# Patient Record
Sex: FEMALE | Race: BLACK OR AFRICAN AMERICAN
[De-identification: names, ages, dates, MRNs, and addresses within clinical notes are randomized per-mention and may not be internally consistent; named-entity substitution may affect disease eponyms.]

---

## 2021-01-10 ENCOUNTER — HOSPITAL ENCOUNTER (EMERGENCY)
Dept: HOSPITAL 63 - ER | Age: 21
Discharge: HOME | End: 2021-01-10
Payer: SELF-PAY

## 2021-01-10 VITALS — HEIGHT: 64 IN | WEIGHT: 123.46 LBS | BODY MASS INDEX: 21.08 KG/M2

## 2021-01-10 VITALS — DIASTOLIC BLOOD PRESSURE: 71 MMHG | SYSTOLIC BLOOD PRESSURE: 105 MMHG

## 2021-01-10 DIAGNOSIS — O23.591: Primary | ICD-10-CM

## 2021-01-10 DIAGNOSIS — Z3A.09: ICD-10-CM

## 2021-01-10 DIAGNOSIS — B96.89: ICD-10-CM

## 2021-01-10 LAB
APTT PPP: YELLOW S
BACTERIA #/AREA URNS HPF: (no result) /HPF
BILIRUB UR QL STRIP: (no result)
FIBRINOGEN PPP-MCNC: (no result) MG/DL
GLUCOSE UR STRIP-MCNC: (no result) MG/DL
NITRITE UR QL STRIP: (no result)
RBC #/AREA URNS HPF: (no result) /HPF (ref 0–2)
SP GR UR STRIP: 1.02
SQUAMOUS #/AREA URNS LPF: (no result) /LPF
UROBILINOGEN UR-MCNC: 1 MG/DL
WBC #/AREA URNS HPF: (no result) /HPF (ref 0–4)

## 2021-01-10 PROCEDURE — 81025 URINE PREGNANCY TEST: CPT

## 2021-01-10 PROCEDURE — 99283 EMERGENCY DEPT VISIT LOW MDM: CPT

## 2021-01-10 PROCEDURE — 81001 URINALYSIS AUTO W/SCOPE: CPT

## 2021-01-10 NOTE — PHYS DOC
Past History


Past Medical History:  No Pertinent History


Past Surgical History:  Other


Additional Past Surgical Histo:  right arm


Alcohol Use:  None





Adult General


Chief Complaint


Chief Complaint:  VAGINAL PROBLEM





HPI


HPI





Patient is a 20-year-old female complaining for vaginal discharge and odor.  

Patient is 9 pregnant, first found out she was pregnant yesterday and currently 

pending outpatient OB/GYN visit to establish care.  Here today as she started 

noticing thicker discharge than usual with a foul-smelling odor.  She is here 

today concerned for bacterial vaginosis.  She has unprotected sex with x1 

individual, her partner.  She has no history of STDs.  She does not want to be 

checked for gonorrhea and chlamydia today and deferred this to her initial 

OB/GYN visit, she just wants to be checked for bacterial vaginosis today





Review of Systems


Review of Systems


Fourteen body systems of review of systems have been reviewed. See HPI for 

pertinent positives and negative responses, other wise all other systems are 

negative, non-pertinent or non-contributory





Allergies


Allergies





Allergies








Coded Allergies Type Severity Reaction Last Updated Verified


 


  No Known Drug Allergies    1/10/21 No











Physical Exam


Physical Exam


Constitutional: Well developed, well nourished, no acute distress, non-toxic 

appearance. 


HENT: Normocephalic, atraumatic, bilateral external ears normal, oropharynx 

moist, no oral exudates, nose normal. 


Eyes: PERRLA, EOMI, conjunctiva normal, no discharge.  


Neck: Normal range of motion, no tenderness, supple, no stridor.  


Cardiovascular: Heart rate regular, sinus rhythm, no murmurs rubs or gallops


Lungs & Thorax:  Bilateral breath sounds clear to auscultation 


Abdomen: Bowel sounds normal, soft, no tenderness, no masses, no pulsatile 

masses.  Nonsurgical abdomen, no peritoneal signs


: External genitalia unremarkable, no lesions or obvious exudate, vaginal 

vault unremarkable, cervix well-appearing, there is moderate amount of 

malodorous thick white discharge without any other concerning abnormalities or 

foreign bodies


Skin: Warm, dry, no erythema, no rash.  


Back: No tenderness, no CVA tenderness.  


Extremities: No tenderness, no cyanosis, no clubbing, ROM intact, no edema.  


Neurologic: Alert and oriented X 3, grossly normal motor & sensory function, no 

focal deficits noted. 


Psychologic: Anxious affect and mood





Current Patient Data


Vital Signs





                                   Vital Signs








  Date Time  Temp Pulse Resp B/P (MAP) Pulse Ox O2 Delivery O2 Flow Rate FiO2


 


1/10/21 07:36 98.1 79 18 105/71 (82) 99 Room Air  








Lab Results





Laboratory Tests








Test


 1/10/21


07:40 1/10/21


07:48


 


Urine Collection Type Unknown  


 


Urine Color Yellow  


 


Urine Clarity Hazy  


 


Urine pH 7.0  


 


Urine Specific Gravity 1.025  


 


Urine Protein Neg  


 


Urine Glucose (UA) Neg mg/dL  


 


Urine Ketones (Stick) Neg mg/dL  


 


Urine Blood Neg  


 


Urine Nitrite Neg  


 


Urine Bilirubin Neg  


 


Urine Urobilinogen Dipstick 1.0 mg/dL  


 


Urine Leukocyte Esterase Neg  


 


Urine RBC Occ /HPF  


 


Urine WBC 1-4 /HPF  


 


Urine Squamous Epithelial


Cells Few /LPF 


 





 


Urine Bacteria Few /HPF  


 


Urine Mucus Slight /LPF  


 


Bedside Urine HCG, Qualitative  hcg positive 











EKG


EKG


[]





Radiology/Procedures


Radiology/Procedures


[]





Heart Score


Risk Factors:


Risk Factors:  DM, Current or recent (<one month) smoker, HTN, HLP, family 

history of CAD, obesity.


Risk Scores:


Risk Factors:  DM, Current or recent (<one month) smoker, HTN, HLP, family 

history of CAD, obesity.





Course & Med Decision Making


Course & Med Decision Making


Discussed with the patient all findings and diagnostic testing. I discussed most

 likely diagnosis of bacterial vaginosis.  Discussed treatment options that 

included Flagyl or clindamycin, joint decision to pursue Flagyl therapy. I 

stressed need for close outpatient follow-up to review today's ER visit. Strict 

return precautions were also discussed at length with good understanding by 

patient. Patient voiced understanding and agreement with the plan. Patient knows

 to come back for repeat evaluation if concerning signs or symptoms present 

prior to outpatient follow-up. Hemodynamically stable, ambulatory and well-

appearing at time of disposition.





Dragon Disclaimer


Dragon Disclaimer


This electronic medical record was generated, in whole or in part, using a voice

 recognition dictation system.





Departure


Departure:


Impression:  


   Primary Impression:  


   Bacterial vaginosis in pregnancy


Disposition:  01 DC HOME SELF CARE/HOMELESS


Condition:  GOOD


Referrals:  


PCP,CHRIS (PCP)


Patient Instructions:  Bacterial Vaginosis





Additional Instructions:  


As discussed prior to ER departure, you were diagnosed with bacterial vaginosis.

  This is not an STD.  This will respond to medical therapy with Flagyl 

medication that was given to you prior to ER departure.  It is imperative 

anyways that you do not drink while pregnant but it is especially imperative to 

not drink while using this medication.  Please follow-up with your OB/GYN to 

establish care as he recently found out you are pregnant.  If any concerning 

signs or symptoms present prior to outpatient follow-up please do not hesitate 

to come back for repeat evaluation.  It was a pleasure to take care of you and I

 wish you the best going forward


Scripts


Metronidazole (FLAGYL) 500 Mg Tablet


500 MG PO BID for bacterial vaginosis for 7 Days, #13 TAB


   Prov: ROSA VALLEJO DO         1/10/21











ROSA VALLEJO DO                 Shamir 10, 2021 07:51

## 2021-01-31 ENCOUNTER — HOSPITAL ENCOUNTER (EMERGENCY)
Dept: HOSPITAL 63 - ER | Age: 21
Discharge: HOME | End: 2021-01-31
Payer: SELF-PAY

## 2021-01-31 VITALS — SYSTOLIC BLOOD PRESSURE: 124 MMHG | DIASTOLIC BLOOD PRESSURE: 62 MMHG

## 2021-01-31 VITALS — HEIGHT: 63 IN | BODY MASS INDEX: 21.09 KG/M2 | WEIGHT: 119.05 LBS

## 2021-01-31 DIAGNOSIS — R10.9: ICD-10-CM

## 2021-01-31 DIAGNOSIS — Z98.890: ICD-10-CM

## 2021-01-31 DIAGNOSIS — O21.9: Primary | ICD-10-CM

## 2021-01-31 LAB
APTT PPP: YELLOW S
BACTERIA #/AREA URNS HPF: 0 /HPF
BASOPHILS # BLD AUTO: 0 X10^3/UL (ref 0–0.2)
BASOPHILS NFR BLD: 1 % (ref 0–3)
BILIRUB UR QL STRIP: (no result)
BUN ISTAT: 8 MG/DL (ref 8–26)
EOSINOPHIL NFR BLD: 0.1 X10^3/UL (ref 0–0.7)
EOSINOPHIL NFR BLD: 1 % (ref 0–3)
ERYTHROCYTE [DISTWIDTH] IN BLOOD BY AUTOMATED COUNT: 13.6 % (ref 11.5–14.5)
FIBRINOGEN PPP-MCNC: (no result) MG/DL
GLUCOSE BLD-MCNC: 79 MG/DL (ref 60–99)
GLUCOSE UR STRIP-MCNC: (no result) MG/DL
HCT VFR BLD AUTO: 38 %
HCT VFR BLD CALC: 43 % (ref 36–47)
HEMOGLOBIN ISTAT: 12.9 GM/DL
HGB BLD-MCNC: 14.4 G/DL (ref 12–15.5)
LYMPHOCYTES # BLD: 2.1 X10^3/UL (ref 1–4.8)
LYMPHOCYTES NFR BLD AUTO: 48 % (ref 24–48)
MCH RBC QN AUTO: 32 PG (ref 25–35)
MCHC RBC AUTO-ENTMCNC: 34 G/DL (ref 31–37)
MCV RBC AUTO: 94 FL (ref 79–100)
MONO #: 0.5 X10^3/UL (ref 0–1.1)
MONOCYTES NFR BLD: 10 % (ref 0–9)
NEUT #: 1.8 X10^3UL (ref 1.8–7.7)
NEUTROPHILS NFR BLD AUTO: 40 % (ref 31–73)
NITRITE UR QL STRIP: (no result)
PLATELET # BLD AUTO: 123 X10^3/UL (ref 140–400)
POTASSIUM BLD-SCNC: 3.7 MMOL/L (ref 3.5–5)
RBC # BLD AUTO: 4.56 X10^6/UL (ref 3.5–5.4)
RBC #/AREA URNS HPF: (no result) /HPF (ref 0–2)
SODIUM SERPL-SCNC: 138 MMOL/L (ref 135–145)
SP GR UR STRIP: 1.02
SQUAMOUS #/AREA URNS LPF: (no result) /LPF
UROBILINOGEN UR-MCNC: 0.2 MG/DL
WBC # BLD AUTO: 4.4 X10^3/UL (ref 4–11)
WBC #/AREA URNS HPF: (no result) /HPF (ref 0–4)

## 2021-01-31 PROCEDURE — 96361 HYDRATE IV INFUSION ADD-ON: CPT

## 2021-01-31 PROCEDURE — 99285 EMERGENCY DEPT VISIT HI MDM: CPT

## 2021-01-31 PROCEDURE — 36415 COLL VENOUS BLD VENIPUNCTURE: CPT

## 2021-01-31 PROCEDURE — 84702 CHORIONIC GONADOTROPIN TEST: CPT

## 2021-01-31 PROCEDURE — 93005 ELECTROCARDIOGRAM TRACING: CPT

## 2021-01-31 PROCEDURE — 96374 THER/PROPH/DIAG INJ IV PUSH: CPT

## 2021-01-31 PROCEDURE — 81025 URINE PREGNANCY TEST: CPT

## 2021-01-31 PROCEDURE — 81001 URINALYSIS AUTO W/SCOPE: CPT

## 2021-01-31 PROCEDURE — 71045 X-RAY EXAM CHEST 1 VIEW: CPT

## 2021-01-31 PROCEDURE — 76801 OB US < 14 WKS SINGLE FETUS: CPT

## 2021-01-31 PROCEDURE — 80047 BASIC METABLC PNL IONIZED CA: CPT

## 2021-01-31 PROCEDURE — 85025 COMPLETE CBC W/AUTO DIFF WBC: CPT

## 2021-01-31 NOTE — RAD
EXAM: Pelvic sonogram.



HISTORY: Bleeding and pain.



TECHNIQUE: Sonographic imaging of the pelvis was performed.



COMPARISON: None.



FINDINGS: The uterus measures 8.7 x 5.3 x 8.2 cm. There is a single intrauterine gestational sac with
 fetal pole and yolk sac. The fetal crown-rump length is 6.3 mm, corresponding with a gestational age
 of 6 weeks and 3 days. The fetal heart rate is 140 bpm. The gestational sac is normal in considerati
on and location. There is no evidence of a subchorionic hematoma. The ovaries are normal in size and 
demonstrate normal blood flow. There is no pelvic free fluid.



IMPRESSION:

1. Single intrauterine fetus with normal heart rate and gestational age based on ultrasound measureme
nts of 6 weeks and 3 days.

2. No acute sonographic finding.



Electronically signed by: Cathleen Moreno MD (1/31/2021 10:31 AM) [facility]

## 2021-01-31 NOTE — RAD
EXAM: CHEST 1 VIEW 



History: Chest pain 



COMPARISON: None available.



TECHNIQUE: Single portable radiograph of the chest



FINDINGS:  The cardiac silhouette is unremarkable. The lungs are clear bilaterally. The costophrenic 
sulci are clear and well demarcated.



IMPRESSION:  No radiographic evidence of an acute cardiopulmonary process.

 



Electronically signed by: Torin Marie MD (1/31/2021 9:33 AM) [id number]

## 2021-01-31 NOTE — PHYS DOC
Past History


Past Medical History:  No Pertinent History


Past Surgical History:  Other


Additional Past Surgical Histo:  right arm


Alcohol Use:  None





Adult General


Chief Complaint


Chief Complaint:  NAUSEA/VOMITING/DIARRHEA





HPI


HPI


Patient is a 20-year-old female who presents to the emergency room complaining 

of abdominal pain, nausea, vomiting, spotting.  Patient reports that she is 

pregnant.  She is unsure how far along she is.  She had a positive pregnancy 

test at the beginning of this month.  She has her first OB appointment tomorrow.

 She states symptoms started a couple days ago.  This is her second pregnancy.  

She states she had vomiting with her first pregnancy as well.  She states the 

abdominal pain is lower and feels like cramping.  She states that she has wiped 

a couple of times over the last couple days and has had a small pink tinge 

suggestive of spotting.  She feels like she is unable to keep anything down.





Review of Systems


Review of Systems


Complete ROS is negative unless otherwise documented in HPI





Current Medications


Current Medications





Current Medications








 Medications


  (Trade)  Dose


 Ordered  Sig/Shalom  Start Time


 Stop Time Status Last Admin


Dose Admin


 


 Ondansetron HCl


  (Zofran)  4 mg  1X  ONCE  1/31/21 08:30


 1/31/21 08:34 DC 1/31/21 08:45


4 MG


 


 Sodium Chloride  1,000 ml @ 


 1,000 mls/hr  1X  ONCE  1/31/21 08:30


 1/31/21 09:29  1/31/21 08:46


1,000 MLS/HR











Allergies


Allergies





Allergies








Coded Allergies Type Severity Reaction Last Updated Verified


 


  No Known Drug Allergies    1/10/21 No











Physical Exam


Physical Exam


General: Awake, alert, NAD. Well Nourished, well hydrated. Cooperative


HEENT: Atraumatic, EOMI, PERRL, airway patent, moist oral mucosa


Neck: Supple, trachea midline


Respiratory: CTA bilaterally, normal effort, no wheezing/crackles


CV: RRR, no murmur, cap refill <2


GI: Soft, nondistended, nontender, no masses


MSK: No obvious deformities


Skin: Warm, dry, intact


Neuro: A&O x3, speech NL, sensory and motor grossly intact, no focal deficits


Psych: Normal affect, normal mood, not suicidal or homicidal





Current Patient Data


Vital Signs





                                   Vital Signs








  Date Time  Temp Pulse Resp B/P (MAP) Pulse Ox O2 Delivery O2 Flow Rate FiO2


 


1/31/21 08:13 97.4 82 16 124/62 (82) 100 Room Air  











EKG


EKG


[]





Radiology/Procedures


Radiology/Procedures


[]





Heart Score


Risk Factors:


Risk Factors:  DM, Current or recent (<one month) smoker, HTN, HLP, family 

history of CAD, obesity.


Risk Scores:


Risk Factors:  DM, Current or recent (<one month) smoker, HTN, HLP, family 

history of CAD, obesity.





Course & Med Decision Making


Course & Med Decision Making


Pertinent Labs and Imaging studies reviewed. (See chart for details)





Patient is a 20-year-old female who presents to the emergency room complaining 

of vomiting, abdominal pain, spotting in pregnancy.  Given that she is not had 

an ultrasound yet we will do a beta-hCG and an ultrasound to rule out ectopic p

regnancy.  CBC and BMP ordered to evaluate patient's hydration status and any 

signs of infection or anemia.  Patient was given fluids and Zofran.  While here 

in the emergency room patient developed chest pain.  EKG and chest x-ray were 

ordered.  Patient was given Tylenol.  She is not hypoxic and pain is not 

suggestive of a pulmonary embolism.  Patient is feeling significantly better.  

She will be discharged home on Zofran.  She has a follow-up appointment 

tomorrow.  Patient's test results and vitals while in the ED were fully reviewed

 and discussed with the patient. Patient is stable and at this time does not 

need admission to the hospital. We have discussed strict return precautions and 

the importance of following up with their Primary Care Physician. Patient stated

 understanding and was given an opportunity to ask any questions. Patient is in 

agreement with plan.





Dragon Disclaimer


Dragon Disclaimer


This electronic medical record was generated, in whole or in part, using a voice

 recognition dictation system.





Departure


Departure:


Impression:  


   Primary Impression:  


   Vomiting affecting pregnancy


Disposition:  01 DC HOME SELF CARE/HOMELESS


Condition:  IMPROVED


Referrals:  


PCP,NO (PCP)


Patient Instructions:  Abdominal Pain During Pregnancy


Scripts


Nitrofurantoin Monohyd/M-Cryst (MACROBID 100 MG CAPSULE) 100 Mg Capsule


1 CAP PO BID for UTI for 5 Days, #10 CAP 0 Refills


   Prov: ROHIT KINSEY MD         1/31/21 


Doxylamine/Pyridoxine Hcl (DICLEGIS  10-10 MG TABLET) 1 Each Tablet.


2 TAB PO QHS for morning sickness for 30 Days, #60 TAB 0 Refills


   Prov: ROHIT KINSEY MD         1/31/21 


Ondansetron (ONDANSETRON ODT) 4 Mg Tab.rapdis


1 TAB PO PRN Q6-8HRS for nausea, #16 TAB


   Prov: ROHIT KINSEY MD         1/31/21











ROHIT KINSEY MD              Jan 31, 2021 09:05

## 2021-01-31 NOTE — EKG
[facility] [address], [place] [address]

Test Date:    2021               Test Time:    09:10:34

Pat Name:     OTF JONES           Department:   

Patient ID:   [id number]           Room:          

Gender:       F                        Technician:   ARAMIS

:          2000               Requested By: ROHIT KINSEY

Order Number: [id number]            Reading MD:     

                                 Measurements

Intervals                              Axis          

Rate:         85                       P:            64

PA:           164                      QRS:          89

QRSD:         96                       T:            62

QT:           346                                    

QTc:          417                                    

                           Interpretive Statements

SINUS RHYTHM

S1,S2,S3 PATTERN

NO SPECIFIC ECG ABNORMALITIES

RI6.02

No previous ECG available for comparison

## 2021-02-15 ENCOUNTER — HOSPITAL ENCOUNTER (EMERGENCY)
Dept: HOSPITAL 63 - ER | Age: 21
Discharge: HOME | End: 2021-02-15
Payer: SELF-PAY

## 2021-02-15 VITALS
SYSTOLIC BLOOD PRESSURE: 143 MMHG | WEIGHT: 119.05 LBS | DIASTOLIC BLOOD PRESSURE: 69 MMHG | HEIGHT: 63 IN | BODY MASS INDEX: 21.09 KG/M2

## 2021-02-15 DIAGNOSIS — Z3A.08: ICD-10-CM

## 2021-02-15 DIAGNOSIS — R10.13: ICD-10-CM

## 2021-02-15 DIAGNOSIS — O21.0: Primary | ICD-10-CM

## 2021-02-15 DIAGNOSIS — F12.10: ICD-10-CM

## 2021-02-15 DIAGNOSIS — O99.321: ICD-10-CM

## 2021-02-15 LAB
ALBUMIN SERPL-MCNC: 3.6 G/DL (ref 3.4–5)
ALP SERPL-CCNC: 51 U/L (ref 46–116)
ALT SERPL-CCNC: 36 U/L (ref 14–59)
AMPHETAMINE/METHAMPHETAMINE: (no result)
ANION GAP SERPL CALC-SCNC: 10 MMOL/L (ref 6–14)
APTT PPP: YELLOW S
AST SERPL-CCNC: 19 U/L (ref 15–37)
BACTERIA #/AREA URNS HPF: (no result) /HPF
BARBITURATES UR-MCNC: (no result) UG/ML
BASOPHILS # BLD AUTO: 0 X10^3/UL (ref 0–0.2)
BASOPHILS NFR BLD: 0 % (ref 0–3)
BENZODIAZ UR-MCNC: (no result) UG/L
BILIRUB DIRECT SERPL-MCNC: 0.2 MG/DL (ref 0–0.2)
BILIRUB SERPL-MCNC: 0.6 MG/DL (ref 0.2–1)
BILIRUB UR QL STRIP: (no result)
CA-I SERPL ISE-MCNC: 9 MG/DL (ref 7–20)
CALCIUM SERPL-MCNC: 8.9 MG/DL (ref 8.5–10.1)
CANNABINOIDS UR-MCNC: (no result) UG/L
CHLORIDE SERPL-SCNC: 101 MMOL/L (ref 98–107)
CO2 SERPL-SCNC: 26 MMOL/L (ref 21–32)
COCAINE UR-MCNC: (no result) NG/ML
CREAT SERPL-MCNC: 0.9 MG/DL (ref 0.6–1)
EOSINOPHIL NFR BLD: 0 X10^3/UL (ref 0–0.7)
EOSINOPHIL NFR BLD: 1 % (ref 0–3)
ERYTHROCYTE [DISTWIDTH] IN BLOOD BY AUTOMATED COUNT: 13.2 % (ref 11.5–14.5)
FIBRINOGEN PPP-MCNC: CLEAR MG/DL
GFR SERPLBLD BASED ON 1.73 SQ M-ARVRAT: 95.6 ML/MIN
GLUCOSE SERPL-MCNC: 89 MG/DL (ref 70–99)
GLUCOSE UR STRIP-MCNC: (no result) MG/DL
HCT VFR BLD CALC: 39 % (ref 36–47)
HGB BLD-MCNC: 13.2 G/DL (ref 12–15.5)
LIPASE: 106 U/L (ref 73–393)
LYMPHOCYTES # BLD: 1.6 X10^3/UL (ref 1–4.8)
LYMPHOCYTES NFR BLD AUTO: 29 % (ref 24–48)
MCH RBC QN AUTO: 31 PG (ref 25–35)
MCHC RBC AUTO-ENTMCNC: 34 G/DL (ref 31–37)
MCV RBC AUTO: 92 FL (ref 79–100)
METHADONE SERPL-MCNC: (no result) NG/ML
MONO #: 0.4 X10^3/UL (ref 0–1.1)
MONOCYTES NFR BLD: 7 % (ref 0–9)
NEUT #: 3.5 X10^3UL (ref 1.8–7.7)
NEUTROPHILS NFR BLD AUTO: 63 % (ref 31–73)
NITRITE UR QL STRIP: (no result)
OPIATES UR-MCNC: (no result) NG/ML
PCP SERPL-MCNC: (no result) MG/DL
PLATELET # BLD AUTO: 169 X10^3/UL (ref 140–400)
POTASSIUM SERPL-SCNC: 3.8 MMOL/L (ref 3.5–5.1)
PROT SERPL-MCNC: 7.9 G/DL (ref 6.4–8.2)
RBC # BLD AUTO: 4.25 X10^6/UL (ref 3.5–5.4)
RBC #/AREA URNS HPF: (no result) /HPF (ref 0–2)
SODIUM SERPL-SCNC: 137 MMOL/L (ref 136–145)
SP GR UR STRIP: 1.02
SQUAMOUS #/AREA URNS LPF: (no result) /LPF
UROBILINOGEN UR-MCNC: 0.2 MG/DL
WBC # BLD AUTO: 5.5 X10^3/UL (ref 4–11)
WBC #/AREA URNS HPF: (no result) /HPF (ref 0–4)

## 2021-02-15 PROCEDURE — 96374 THER/PROPH/DIAG INJ IV PUSH: CPT

## 2021-02-15 PROCEDURE — 80048 BASIC METABOLIC PNL TOTAL CA: CPT

## 2021-02-15 PROCEDURE — 86901 BLOOD TYPING SEROLOGIC RH(D): CPT

## 2021-02-15 PROCEDURE — 96376 TX/PRO/DX INJ SAME DRUG ADON: CPT

## 2021-02-15 PROCEDURE — 85025 COMPLETE CBC W/AUTO DIFF WBC: CPT

## 2021-02-15 PROCEDURE — 85730 THROMBOPLASTIN TIME PARTIAL: CPT

## 2021-02-15 PROCEDURE — 86900 BLOOD TYPING SEROLOGIC ABO: CPT

## 2021-02-15 PROCEDURE — 99284 EMERGENCY DEPT VISIT MOD MDM: CPT

## 2021-02-15 PROCEDURE — 96375 TX/PRO/DX INJ NEW DRUG ADDON: CPT

## 2021-02-15 PROCEDURE — 36415 COLL VENOUS BLD VENIPUNCTURE: CPT

## 2021-02-15 PROCEDURE — 81001 URINALYSIS AUTO W/SCOPE: CPT

## 2021-02-15 PROCEDURE — 96361 HYDRATE IV INFUSION ADD-ON: CPT

## 2021-02-15 PROCEDURE — 80307 DRUG TEST PRSMV CHEM ANLYZR: CPT

## 2021-02-15 PROCEDURE — 83735 ASSAY OF MAGNESIUM: CPT

## 2021-02-15 PROCEDURE — 76705 ECHO EXAM OF ABDOMEN: CPT

## 2021-02-15 PROCEDURE — 80076 HEPATIC FUNCTION PANEL: CPT

## 2021-02-15 PROCEDURE — 85610 PROTHROMBIN TIME: CPT

## 2021-02-15 PROCEDURE — 84702 CHORIONIC GONADOTROPIN TEST: CPT

## 2021-02-15 PROCEDURE — 83690 ASSAY OF LIPASE: CPT

## 2021-02-15 NOTE — PHYS DOC
Past History


Past Medical History:  No Pertinent History


 (ELYSE MCKEON MD)


Past Surgical History:  Other


Additional Past Surgical Histo:  right arm


 (ELYSE MCKEON MD)


Alcohol Use:  None


 (ELYSE MCKEON MD)





General Adult


HPI:


HPI:


".. I am out of my nausea.. meds.. the Zofran.. ..  I ate chicken quesadilla.. 

Last night.. But after I went to bed I started having my vomiting and.. I have  

... been vomiting all night long..... I was here back on the  for 

the same thing.. no spotting tonight....just this stomach pain from 

vomiting..."."  up here in the pit of my stomach..  " I  am vomiting so hard.. 

it makes my upper back and chest hurt now.. ".." Just drank water this morning 

.. and I am even puking that up.. "








Patient is a 20 year old female who presents with above hx and complaints 

intractable hyperemesis gravidarum since eating quesadilla last night.  Patient 

seen on arrival.  Patient is estimated 8 weeks pregnant.  This is her second 

pregnancy.  Did have similar symptoms with first pregnancy.  Pt. has been taking

prenatal vitamins when she is not vomiting.  Patient does have scheduled follow-

up at  where she plans to deliver.  Patient currently following at the health 

department.  Patient did have bacterial vaginosis on 1/10/2021 visit which was 

treated with antibiotics.  Patient requesting Zofran which worked for her in the

past for the intractable vomiting associated with her pregnancy.  Patient denies

any travel.  Patient denies significant ill contacts.  Patient denies any 

trauma.  Patient denies any intake of bad food.   Patient currently dry heaving 

during her interview.  Pt. has not had US as yet with this pregnancy.   Pt. 

denies prior history of gallbladder disease with her  or family members.


 (ELYSE MCKEON MD)





Review of Systems:


Review of Systems:


Constitutional:  Denies fever or chills 


Eyes:  Denies change in visual acuity 


HENT:  Denies nasal congestion or sore throat 


Respiratory:  Denies cough or shortness of breath 


Cardiovascular:  Denies chest pain or edema 


GI:  Complaints of epigastric and rt. upper quadrant abdominal pain, nausea, 

vomiting,.  Denies bloody stools or diarrhea 


: Denies dysuria 


Musculoskeletal:  Denies back pain or joint pain 


Integument:  Denies rash 


Neurologic:  Denies headache, focal weakness or sensory changes 


Endocrine:  Denies polyuria or polydipsia 


Lymphatic:  Denies swollen glands 


Psychiatric:  Denies depression or anxiety


 (ELYSE MCKEON MD)





Family History:


Family History:


Noncontributory to presentation.


 (ELYSE MCKEON MD)





Current Medications:


Current Meds:


See Nursing


 (ELYSE MCKEON MD)





Allergies:


Allergies:





Allergies








Coded Allergies Type Severity Reaction Last Updated Verified


 


  No Known Drug Allergies    1/10/21 No








 (ELYSE MCKEON MD)





Physical Exam:


PE:





Constitutional: in acute distress, non-toxic appearance. Actively dry heaving.]


HENT: Normocephalic, atraumatic, bilateral external ears normal, oropharynx dry,

no oral exudates, nose normal. []


Eyes: PERRLA, EOMI, conjunctiva normal, no discharge. [] 


Neck: Normal range of motion, no tenderness, supple, no stridor. [] 


Cardiovascular: Tachycardia Heart rate regular rhythm, no murmur []  Monitor 

show sinus tachycardia rhythm, no obvious acute morphology.


Lungs & Thorax:  Bilateral breath sounds equal at apexes on auscultation []


Abdomen: Bowel sounds decreased, soft, epigatric  and right upper abd. 

tenderness, no masses, no pulsatile masses.  Pt. deferring pelvic exam at this 

time states she has not had any discharge or bleeding.


Skin: Warm, dry, no erythema, no rash. [] 


Back: No tenderness, no CVA tenderness. [] 


Extremities: No tenderness, no cyanosis, no clubbing, ROM intact, no edema.  No 

cording appreciated.  Scar Rt. arm. 


Neurologic: Alert and oriented X 3, moves extremities on request, has distal 

sensory,, no focal deficits noted. [] DTRs +2 patella and brachial.


Psychologic: Affect anxious, judgement normal, mood normal. []


 (ELYSE MCKEON MD)





EKG:


EKG:


[]


 (ELYSE MCKEON MD)





Radiology/Procedures:


Radiology/Procedures:


Ultrasound pending at shift change []


Impressions:


                               [facility] [address], [place] [address]


                                 [phone]


                                        


                                 IMAGING REPORT





                                     Signed





PATIENT: OTF JONES ACCOUNT: [id number]     MRN#: [id number]


: 2000           LOCATION: ER              AGE: 20


SEX: F                    EXAM DT: 02/15/21         ACCESSION#: [id number]


STATUS: REG ER            ORD. PHYSICIAN: ELYSE MCKEON MD


REASON:  Rt upper,epigastric pain, est 8 week pregnant


PROCEDURE: ABDOMEN LTD





EXAM:  Gallbladder Ultrasound





INDICATION: Reason: Rt upper,epigastric pain, est 8 week pregnant / Spl. 

Instructions:  / History: 





TECHNIQUE:  Real-time ultrasound of the gallbladder was performed with permanent

 freeze-frame documentation.





COMPARISON: None





FINDINGS: 





BILIARY:?Gallbladder is unremarkable. No biliary ductal dilatation. The common 

bile duct measures 0.3 cm.





OTHER: Visualized liver, right kidney and pancreas are unremarkable.





IMPRESSION: 





Normal gallbladder ultrasound.





Electronically signed by: Melissa Mercado MD (2/15/2021 7:55 AM) [id number]














DICTATED AND SIGNED BY:     MELISSA MERCADO MD


DATE:     02/15/21 0754





CC: ELYSE MCKEON MD; PCP,NO ~MTH0 0


 (ELYSE MCKEON MD)





Heart Score:


Risk Factors:


Risk Factors:  DM, Current or recent (<one month) smoker, HTN, HLP, family 

history of CAD, obesity.


Risk Scores:


Score 0 - 3:  2.5% MACE over next 6 weeks - Discharge Home


Score 4 - 6:  20.3% MACE over next 6 weeks - Admit for Clinical Observation


Score 7 - 10:  72.7% MACE over next 6 weeks - Early Invasive Strategies


 (ELYSE MCKEON MD)





Course & Med Decision Making:


Course & Med Decision Making


Pertinent Labs and Imaging studies reviewed. (See chart for details)





Patient endorsed to  at shift change.


Labs, US pending.   She will make disposition.





Impression:





1. Hx. G2, T1


2. Hx Estimate 8 weeks gravid


3.  Abdomen pain-epigastric


4.  Hyperemesis gravidarum


5.  Beta HCG= 36, 689- on , Tonight 148,575


6.  Blood Type Mother= AB+ positive





[]


 (ELYSE MCKEON MD)


Course & Med Decision Making


***DUE TO PROLONGED EMR DOWNTIME/HOSPITAL POLICY, PTS' FULL CHART FROM MY 

EVALUATION WAS DOCUMENTED VIA PAPER CHARTING. - PLEASE REFER TO PAPER DOCUMENTS 

FOR FULL INFORMATION REGARDING PTS' ED VISIT.***


 (GAEL BRICE DO)


Dragon Disclaimer:


Dragon Disclaimer:


This electronic medical record was generated, in whole or in part, using a voice

 recognition dictation system.


 (ELYSE MCKEON MD)





Departure


Departure:


Impression:  


   Primary Impression:  


   Hyperemesis gravidarum


   Additional Impression:  


   Marijuana use


Disposition:  01 DC HOME SELF CARE/HOMELESS


Condition:  STABLE


Referrals:  


PCP,NO (PCP)





Dragon Disclaimer


This chart was dictated in whole or in part using Voice Recognition software in 

a busy, high-work load, and often noisy Emergency Department environment.  It 

may contain unintended and wholly unrecognized errors or omissions.


 (ELYSE MCKEON MD)





Dragon Disclaimer


This chart was dictated in whole or in part using Voice Recognition software in 

a busy, high-work load, and often noisy Emergency Department environment.  It 

may contain unintended and wholly unrecognized errors or omissions.


 (ELYSE MCKEON MD)





Dragon Disclaimer


This chart was dictated in whole or in part using Voice Recognition software in 

a busy, high-work load, and often noisy Emergency Department environment.  It 

may contain unintended and wholly unrecognized errors or omissions.


 (ELYSE MCKEON MD)





Dragon Disclaimer


This chart was dictated in whole or in part using Voice Recognition software in 

a busy, high-work load, and often noisy Emergency Department environment.  It 

may contain unintended and wholly unrecognized errors or omissions.


 (ELYSE MCKEON MD)











ELYSE MCKEON MD           Feb 15, 2021 05:34


GAEL BRICE DO               Feb 15, 2021 13:50

## 2021-02-15 NOTE — RAD
EXAM:  Gallbladder Ultrasound



INDICATION: Reason: Rt upper,epigastric pain, est 8 week pregnant / Spl. Instructions:  / History: 



TECHNIQUE:  Real-time ultrasound of the gallbladder was performed with permanent freeze-frame documen
tation.



COMPARISON: None



FINDINGS: 



BILIARY:?Gallbladder is unremarkable. No biliary ductal dilatation. The common bile duct measures 0.3
 cm.



OTHER: Visualized liver, right kidney and pancreas are unremarkable.



IMPRESSION: 



Normal gallbladder ultrasound.



Electronically signed by: Rashida Mercado MD (2/15/2021 7:55 AM) [id number]

## 2021-02-22 ENCOUNTER — HOSPITAL ENCOUNTER (EMERGENCY)
Dept: HOSPITAL 63 - ER | Age: 21
Discharge: HOME | End: 2021-02-22
Payer: SELF-PAY

## 2021-02-22 VITALS — SYSTOLIC BLOOD PRESSURE: 113 MMHG | DIASTOLIC BLOOD PRESSURE: 73 MMHG

## 2021-02-22 VITALS — HEIGHT: 63 IN | WEIGHT: 119.05 LBS | BODY MASS INDEX: 21.09 KG/M2

## 2021-02-22 DIAGNOSIS — O21.0: Primary | ICD-10-CM

## 2021-02-22 DIAGNOSIS — Z3A.09: ICD-10-CM

## 2021-02-22 LAB
ALBUMIN SERPL-MCNC: 3.6 G/DL (ref 3.4–5)
ALBUMIN/GLOB SERPL: 0.8 {RATIO} (ref 1–1.7)
ALP SERPL-CCNC: 52 U/L (ref 46–116)
ALT SERPL-CCNC: 26 U/L (ref 14–59)
ANION GAP SERPL CALC-SCNC: 10 MMOL/L (ref 6–14)
APTT PPP: YELLOW S
AST SERPL-CCNC: 17 U/L (ref 15–37)
BACTERIA #/AREA URNS HPF: (no result) /HPF
BASOPHILS # BLD AUTO: 0 X10^3/UL (ref 0–0.2)
BASOPHILS NFR BLD: 0 % (ref 0–3)
BILIRUB SERPL-MCNC: 1 MG/DL (ref 0.2–1)
BILIRUB UR QL STRIP: (no result)
BUN/CREAT SERPL: 14 (ref 6–20)
CA-I SERPL ISE-MCNC: 10 MG/DL (ref 7–20)
CALCIUM SERPL-MCNC: 9.3 MG/DL (ref 8.5–10.1)
CHLORIDE SERPL-SCNC: 102 MMOL/L (ref 98–107)
CO2 SERPL-SCNC: 26 MMOL/L (ref 21–32)
CREAT SERPL-MCNC: 0.7 MG/DL (ref 0.6–1)
EOSINOPHIL NFR BLD: 0 % (ref 0–3)
EOSINOPHIL NFR BLD: 0 X10^3/UL (ref 0–0.7)
ERYTHROCYTE [DISTWIDTH] IN BLOOD BY AUTOMATED COUNT: 13 % (ref 11.5–14.5)
FIBRINOGEN PPP-MCNC: CLEAR MG/DL
GFR SERPLBLD BASED ON 1.73 SQ M-ARVRAT: 127.8 ML/MIN
GLOBULIN SER-MCNC: 4.6 G/DL (ref 2.2–3.8)
GLUCOSE SERPL-MCNC: 95 MG/DL (ref 70–99)
GLUCOSE UR STRIP-MCNC: (no result) MG/DL
HCT VFR BLD CALC: 42 % (ref 36–47)
HGB BLD-MCNC: 14.2 G/DL (ref 12–15.5)
LIPASE: 90 U/L (ref 73–393)
LYMPHOCYTES # BLD: 1.8 X10^3/UL (ref 1–4.8)
LYMPHOCYTES NFR BLD AUTO: 28 % (ref 24–48)
MCH RBC QN AUTO: 31 PG (ref 25–35)
MCHC RBC AUTO-ENTMCNC: 34 G/DL (ref 31–37)
MCV RBC AUTO: 92 FL (ref 79–100)
MONO #: 0.3 X10^3/UL (ref 0–1.1)
MONOCYTES NFR BLD: 4 % (ref 0–9)
NEUT #: 4.4 X10^3UL (ref 1.8–7.7)
NEUTROPHILS NFR BLD AUTO: 67 % (ref 31–73)
NITRITE UR QL STRIP: (no result)
PLATELET # BLD AUTO: 191 X10^3/UL (ref 140–400)
POTASSIUM SERPL-SCNC: 4.1 MMOL/L (ref 3.5–5.1)
PROT SERPL-MCNC: 8.2 G/DL (ref 6.4–8.2)
RBC # BLD AUTO: 4.55 X10^6/UL (ref 3.5–5.4)
RBC #/AREA URNS HPF: (no result) /HPF (ref 0–2)
SODIUM SERPL-SCNC: 138 MMOL/L (ref 136–145)
SP GR UR STRIP: 1.02
SQUAMOUS #/AREA URNS LPF: (no result) /LPF
UROBILINOGEN UR-MCNC: 1 MG/DL
WBC # BLD AUTO: 6.5 X10^3/UL (ref 4–11)
WBC #/AREA URNS HPF: (no result) /HPF (ref 0–4)

## 2021-02-22 PROCEDURE — 85025 COMPLETE CBC W/AUTO DIFF WBC: CPT

## 2021-02-22 PROCEDURE — 81001 URINALYSIS AUTO W/SCOPE: CPT

## 2021-02-22 PROCEDURE — 96374 THER/PROPH/DIAG INJ IV PUSH: CPT

## 2021-02-22 PROCEDURE — 96361 HYDRATE IV INFUSION ADD-ON: CPT

## 2021-02-22 PROCEDURE — 80053 COMPREHEN METABOLIC PANEL: CPT

## 2021-02-22 PROCEDURE — 99283 EMERGENCY DEPT VISIT LOW MDM: CPT

## 2021-02-22 PROCEDURE — 36415 COLL VENOUS BLD VENIPUNCTURE: CPT

## 2021-02-22 PROCEDURE — 83690 ASSAY OF LIPASE: CPT

## 2021-02-22 NOTE — PHYS DOC
Past History


Past Medical History:  No Pertinent History


Past Surgical History:  Other


Additional Past Surgical Histo:  right arm


Alcohol Use:  None


Social History Narrative:  none since preg





Adult General


Chief Complaint


Chief Complaint:  VOMITING IN PREGNANCY





HPI


HPI





Patient is a 20F with no significant past medical history 9 weeks pregnant by 

ultrasound presents emergency department for new onset of nausea and vomiting.  

This is a patient second pregnancy and she states that she did not have this 

problem during the first pregnancy.  Patient states that she has been having 

issues with prolonged nausea and vomiting over the last 2 weeks.  Patient was 

seen here approximate 1 week ago and was given Zofran and had improvement of her

symptoms but states that she ran out.  Patient states that she woke up this 

morning around 4 AM and noted new onset of nausea and vomiting without any 

diarrhea or fever.  Denies any associated abdominal pain denies any chest pain 

or shortness of breath.





Review of Systems


Review of Systems





Constitutional: Denies fever or chills []


Eyes: Denies change in visual acuity, redness, or eye pain []


HENT: Denies nasal congestion or sore throat []


Respiratory: Denies cough or shortness of breath []


Cardiovascular: No additional information not addressed in HPI []


GI: Denies abdominal pain, nausea, vomiting, bloody stools or diarrhea []


: Denies dysuria or hematuria []


Musculoskeletal: Denies back pain or joint pain []


Integument: Denies rash or skin lesions []


Neurologic: Denies headache, focal weakness or sensory changes []


Endocrine: Denies polyuria or polydipsia []





All other systems were reviewed and found to be within normal limits, except as 

documented in this note.





Current Medications


Current Medications





Current Medications








 Medications


  (Trade)  Dose


 Ordered  Sig/Shalom  Start Time


 Stop Time Status Last Admin


Dose Admin


 


 Metoclopramide HCl


  (Reglan)  10 mg  1X  ONCE  2/22/21 10:15


 2/22/21 10:16 DC  





 


 Ondansetron HCl


  (Zofran)  4 mg  STK-MED ONCE  2/22/21 10:05


 2/22/21 10:05 DC  














Allergies


Allergies





Allergies








Coded Allergies Type Severity Reaction Last Updated Verified


 


  No Known Drug Allergies    2/22/21 No











Physical Exam


Physical Exam





Constitutional: Well developed, well nourished, no acute distress, non-toxic 

appearance. []


HENT: Normocephalic, atraumatic, bilateral external ears normal, oropharynx 

moist, no oral exudates, nose normal. []


Eyes: PERRLA, EOMI, conjunctiva normal, no discharge. [] 


Neck: Normal range of motion, no tenderness, supple, no stridor. [] 


Cardiovascular:Heart rate regular rhythm, no murmur []


Lungs & Thorax:  Bilateral breath sounds clear to auscultation []


Abdomen: Bowel sounds normal, soft, no tenderness, no masses, no pulsatile 

masses. [] 


Skin: Warm, dry, no erythema, no rash. [] 


Back: No tenderness, no CVA tenderness. [] 


Extremities: No tenderness, no cyanosis, no clubbing, ROM intact, no edema. [] 


Neurologic: Alert and oriented X 3, normal motor function, normal sensory 

function, no focal deficits noted. []


Psychologic: Affect normal, judgement normal, mood normal. []





Current Patient Data


Vital Signs





                                   Vital Signs








  Date Time  Temp Pulse Resp B/P (MAP) Pulse Ox O2 Delivery O2 Flow Rate FiO2


 


2/22/21 10:08 98.0 81 20 119/51 (73) 99 Room Air  











EKG


EKG


[]





Radiology/Procedures


Radiology/Procedures


[]





Heart Score


Risk Factors:


Risk Factors:  DM, Current or recent (<one month) smoker, HTN, HLP, family 

history of CAD, obesity.


Risk Scores:


Risk Factors:  DM, Current or recent (<one month) smoker, HTN, HLP, family 

history of CAD, obesity.





Course & Med Decision Making


Course & Med Decision Making


Pertinent Labs and Imaging studies reviewed. (See chart for details)





20F with pregnancy associated nausea and vomiting.  No significant abdominal 

tenderness at this time.  At this time will obtain basic labs make sure there is

 no significant underlying electrolyte deficiency or abnormality and treat the 

patient with IV fluids and antiemetics.





Dragon Disclaimer


Dragon Disclaimer


This electronic medical record was generated, in whole or in part, using a voice

 recognition dictation system.





Departure


Departure:


Impression:  


   Primary Impression:  


   Hyperemesis arising during pregnancy


Disposition:  01 DC HOME SELF CARE/HOMELESS


Condition:  GOOD


Referrals:  


BECK TRACEY MD


Patient Instructions:  Hyperemesis Gravidarum





Additional Instructions:  


 EMERGENCY DEPARTMENT GENERAL DISCHARGE INSTRUCTIONS





Thank you for coming to [facility] Emergency Department (ED) 

today and 


trusting us with you care.  We trust that you had a positive experience in our 

Emergency 


Department.  If you wish to speak to the department management, you may call the

 Director at 


[phone].





YOUR FOLLOW UP INSTRUCTIONS ARE AS FOLLOWS:





1.  Do you have a private Doctor?  If you do not have a private doctor, please 

ask for a 


resource list of physicians or clinics that may be able to assist you with 

follow up care.





2.  The Emergency Physicain has interpreted your x-rays.  The X-Ray specialist 

will also 


review them.  If there is a change in the findings, you will be notified in 48 

hours when at 


all possible.





3.  A lab test or culture has been done, your results will be reviewed and you 

will be 


notified if you need a change in treatment.





ADDITIONAL INSTRUCTIONS AND INFORMATION:





1.  Your care today has been supervised by a physician who is specially trained 

in emergency 


care.  Many problems require more than one evaluation for a complete diagnosis 

and 


treatment.  We recommend that you schedule your follow up appointment as 

recommended to 


ensure complete treatment of you illness or injury.  If you are unable to obtain

 follow up 


care and continue to have a problem, or if your condition worsens, we recommend 

that you 


return to the ED.





2.  We are not able to safely determine your condition over the phone nor are we

 able to 


give sound medical advice over the phone.  For these safety reasons, if you call

 for medical 


advice we will ask you to come to the ED for further evaluation.





3.  If you have any questions regarding these discharge instructions please call

 the ED at 


[phone].





SAFETY INFORMATION:





In the interest of safety, wellness, and injury prevention; we encourage you to 

wear your 


sealbelt, if you smoke; quite smoking, and we encourage family to use a 

protective helmet 


for bicycling and other sporting events that present an increased risk for head 

injury.





IF YOUR SYMPTOMS WORSEN OR NEW SYMPTOMS DEVELOP, OR YOU HAVE CONCERNS ABOUT YOUR

 CONDITION; 


OR IF YOUR CONDITION WORSENS WHILE YOU ARE WAITING FOR YOUR FOLLOW UP 

APPOINTMENT; EITHER 


CONTACT YOUR PRIMARY CARE DOCTOR, THE PHYSICIAN WHOSE NAME AND NUMBER YOU WERE 

GIVEN, OR 


RETURN TO THE ED IMMEDIATELY.


Scripts


Ondansetron Hcl (ZOFRAN) 4 Mg Tablet


1 TAB PO PRN Q6HRS PRN for NAUSEA, #6 TAB


   Prov: DILSHAD DELGADO MD         2/22/21











DILSHAD DELGADO MD              Feb 22, 2021 10:19